# Patient Record
Sex: FEMALE | Race: WHITE | NOT HISPANIC OR LATINO | Employment: UNEMPLOYED | URBAN - METROPOLITAN AREA
[De-identification: names, ages, dates, MRNs, and addresses within clinical notes are randomized per-mention and may not be internally consistent; named-entity substitution may affect disease eponyms.]

---

## 2019-11-04 ENCOUNTER — OFFICE VISIT (OUTPATIENT)
Dept: ENDOCRINOLOGY | Facility: CLINIC | Age: 59
End: 2019-11-04

## 2019-11-04 VITALS
WEIGHT: 146 LBS | SYSTOLIC BLOOD PRESSURE: 100 MMHG | HEIGHT: 64 IN | OXYGEN SATURATION: 98 % | BODY MASS INDEX: 24.92 KG/M2 | DIASTOLIC BLOOD PRESSURE: 60 MMHG | HEART RATE: 67 BPM

## 2019-11-04 DIAGNOSIS — E03.8 SUBCLINICAL HYPOTHYROIDISM: Primary | ICD-10-CM

## 2019-11-04 PROCEDURE — 99204 OFFICE O/P NEW MOD 45 MIN: CPT | Performed by: INTERNAL MEDICINE

## 2019-11-04 RX ORDER — ESTRADIOL 0.1 MG/D
FILM, EXTENDED RELEASE TRANSDERMAL
COMMUNITY
Start: 2019-10-27

## 2019-11-04 RX ORDER — ESTRADIOL 0.05 MG/D
FILM, EXTENDED RELEASE TRANSDERMAL
COMMUNITY
Start: 2019-10-27

## 2019-11-04 NOTE — PROGRESS NOTES
"Chief Complaint   Patient presents with   • Thyroid Problem     New patient referred by Janice LEON       HPI:   Estephania Fitch is a 59 y.o.female referred by EDWARD Benitez for further evaluation of her thyroid gland. Her history is as follows:    1) abnormal thyroid function tests:  - pt reports developing hypopigmentation in the upper anterior neck with a subjective feeling of tissue swelling that began approximately 1-2 years ago  - she reports chronic fatigue and \"brain fog\", weight gain since 2016/2017. She has had multiple thyroid function tests and thyroid antibody tests. Results have not shown overt hypothyroidism.   - Pt had a TSH in the upper end of the normal range in 2017. She was prescribed levothyroxine which she took for a short time. She states she didn't tolerate the medication.  - She then started to take OTC supplements call \"thyroid support\" that basically contains zinc, selenium, kelp, ashgwanda and Tyrosine. She also started taking iodine supplements.     Outside Lab summary:  (05/10/2016) TSH 2.090  (06/01/2017) TSH 4.330 (0.450 - 4.500): pt prescribed levothyroxine  (07/27/2017) TSH 2.500 (0.450 - 4.500) - on levothyroxine, took for a short time. States she didn't tolerate  (06/11/2018) TSH 2.850 (0.450 - 4.500) - on no medication  (04/24/2019) TSH 1.700 (0.450 - 4.50), Free T4 1.2 (0.5 - 1.8), Free T3 2.6 (2.3 - 4.2), TPO Ab 1, TG Ab  <1  (08/24/2019) TSH 4.590 (0.450 - 4.50), Free T4 1.28 (0.82 - 1.77), TPO Ab 1, TG Ab  <1    FMHx: she has two sisters with hypothyroidism who take thyroid hormone extracts. No known thyroid cancer in the family  PMHx: had sleep study in [place] that was reported as normal    Review of Systems   Constitutional: Positive for fatigue.        Weight gain   HENT: Negative.    Eyes: Negative.    Respiratory: Negative.    Cardiovascular: Negative.    Gastrointestinal:        Frequent heartburn   Endocrine: Negative.    Genitourinary: " "Negative.    Musculoskeletal: Negative.    Skin: Negative.    Allergic/Immunologic: Negative.    Neurological: Negative.    Hematological: Negative.    Psychiatric/Behavioral: Negative for sleep disturbance.        Difficulties with memory     Past Medical History:   Diagnosis Date   • Hypothyroidism      family history includes Cancer in her father and mother; Diabetes in her mother; Heart disease in her mother; Thyroid disease in her maternal aunt and sister.  Past Surgical History:   Procedure Laterality Date   • COLONOSCOPY     • HYSTERECTOMY  05/1999   • OOPHORECTOMY      Age 13     Social History     Tobacco Use   • Smoking status: Never Smoker   • Smokeless tobacco: Never Used   Substance Use Topics   • Alcohol use: No     Frequency: Never   • Drug use: No     Outpatient Medications Prior to Visit   Medication Sig Dispense Refill   • estradiol (MINIVELLE, VIVELLE-DOT) 0.05 MG/24HR patch      • estradiol (MINIVELLE, VIVELLE-DOT) 0.1 MG/24HR patch      • testosterone (ANDROGEL) 25 MG/2.5GM (1%) gel gel Place  on the skin Daily.     • estradiol (VIVELLE-DOT) 0.025 MG/24HR patch Place 1 patch on the skin 2 (Two) Times a Week.     • hyoscyamine (LEVSIN) 0.125 MG SL tablet Take 1 tablet by mouth Every 4 (Four) Hours As Needed for cramping. 15 tablet 0     No facility-administered medications prior to visit.      Allergies   Allergen Reactions   • Sulfa Antibiotics Hives       /60   Pulse 67   Ht 162.6 cm (64\")   Wt 66.2 kg (146 lb)   SpO2 98%   BMI 25.06 kg/m²   Physical Exam   Constitutional: She is oriented to person, place, and time. She appears well-developed. No distress.   HENT:   Head: Normocephalic.   Mouth/Throat: Oropharynx is clear and moist.   Eyes: Conjunctivae and EOM are normal. Pupils are equal, round, and reactive to light.   Neck: No tracheal deviation present. No thyromegaly present.   No palpable thyroid nodules     Cardiovascular: Normal rate, regular rhythm and normal heart sounds. "   No murmur heard.  Pulmonary/Chest: Effort normal and breath sounds normal. No respiratory distress.   Abdominal: Soft. Bowel sounds are normal. She exhibits no mass. There is no tenderness.   Lymphadenopathy:     She has no cervical adenopathy.   Neurological: She is alert and oriented to person, place, and time. No cranial nerve deficit.   Skin: Skin is warm and dry. She is not diaphoretic. No erythema.   Anterior neck with hypopigmentation c/w vitiligo   Psychiatric: She has a normal mood and affect. Her behavior is normal.   Vitals reviewed.    LABS/IMAGING: outside records reviewed and summarized in HPI  Results for orders placed or performed in visit on 11/04/19   TSH   Result Value Ref Range    TSH 4.200 0.270 - 4.200 uIU/mL       ASSESSMENT/PLAN:  1) subclinical hypothyroidism:  - clinically euthyroid on exam  - TSH checked today WNL but at the upper end of the normal range.   - looked at her Thyroid gland briefly with US in clinic which showed a slightly hypoechoic, heterogeneous gland with increased vascularity and no discrete nodules. A few 1-2 mm cysts were seen scattered throughout the gland. Theses were non-specific findings.   - Discussed with patient that her symptoms may nothing to do with her thyroid gland. However, her recent labs is 08/2019 and 11/2019 could be treated as subclinical hypothyroidism. She has sisters who take thyroid hormone extract, so she is interested in pursuing that formulation of thyroid hormone  - Discussed with patient that a trial of NP Thyroid 15 mg daily would be okay;however, if she notes no difference on the medication, she should remain off thyroid hormone until she develops overt hypothyroidism.  - Pt given samples of NP thyroid 15 mg daily    RTC 4 months    Counseling was given to patient for the following topics:  diagnostic results, instructions for management, impressions and risks and benefits of treatment options, see details in assessment/plan. Total face to  face time of the encounter was 45 minutes and 30 minutes was spent counseling.

## 2019-11-05 LAB — TSH SERPL DL<=0.005 MIU/L-ACNC: 4.2 UIU/ML (ref 0.27–4.2)

## 2019-11-10 ENCOUNTER — TELEPHONE (OUTPATIENT)
Dept: ENDOCRINOLOGY | Facility: CLINIC | Age: 59
End: 2019-11-10

## 2023-07-07 PROBLEM — R07.9 CHEST PAIN: Status: ACTIVE | Noted: 2023-07-07

## 2023-07-07 PROBLEM — R00.2 PALPITATIONS: Status: ACTIVE | Noted: 2023-07-07

## 2025-05-22 ENCOUNTER — TELEPHONE (OUTPATIENT)
Dept: CARDIOLOGY | Facility: CLINIC | Age: 65
End: 2025-05-22
Payer: COMMERCIAL

## 2025-05-28 ENCOUNTER — TELEPHONE (OUTPATIENT)
Dept: CARDIOLOGY | Facility: CLINIC | Age: 65
End: 2025-05-28
Payer: COMMERCIAL

## 2025-06-02 ENCOUNTER — OFFICE VISIT (OUTPATIENT)
Dept: CARDIOLOGY | Facility: CLINIC | Age: 65
End: 2025-06-02
Payer: MEDICARE

## 2025-06-02 VITALS
OXYGEN SATURATION: 99 % | HEIGHT: 64 IN | WEIGHT: 141.4 LBS | SYSTOLIC BLOOD PRESSURE: 112 MMHG | BODY MASS INDEX: 24.14 KG/M2 | HEART RATE: 64 BPM | DIASTOLIC BLOOD PRESSURE: 72 MMHG

## 2025-06-02 DIAGNOSIS — R07.2 PRECORDIAL PAIN: ICD-10-CM

## 2025-06-02 DIAGNOSIS — R00.2 PALPITATIONS: Primary | ICD-10-CM

## 2025-06-02 PROCEDURE — 93000 ELECTROCARDIOGRAM COMPLETE: CPT | Performed by: INTERNAL MEDICINE

## 2025-06-02 PROCEDURE — 1159F MED LIST DOCD IN RCRD: CPT | Performed by: INTERNAL MEDICINE

## 2025-06-02 PROCEDURE — 99214 OFFICE O/P EST MOD 30 MIN: CPT | Performed by: INTERNAL MEDICINE

## 2025-06-02 PROCEDURE — 1160F RVW MEDS BY RX/DR IN RCRD: CPT | Performed by: INTERNAL MEDICINE

## 2025-06-02 NOTE — PROGRESS NOTES
OFFICE VISIT  NOTE  [facility] CARDIOLOGY      Name: Estephania Fitch    Date: 2025  MRN:  [id number]  :  1960      REFERRING/PRIMARY PROVIDER:  Martell Marie MD    Chief Complaint   Patient presents with    Chest pain, unspecified type       HPI: Estephania Fitch is a 65 y.o. female who presents for palpitations.   Echo at [facility] 2023 showed normal ejection fraction with trace to mild TR, PASP 39.  She has not had a sleep study in the past.  Holter monitor showed 17 short episodes of AT, otherwise no arrhythmia.  She had a low risk stress test 2023.  She reports more palpitations since 2025, she has been adjusting her hormone replacement therapy and thinks it may be related to that or back and neck pain which she suffered while lifting her grandchild and teen care of him over a couple months time.  Palpitations are daily, feels like flip-flops, flutters, skipped beats.    Past Medical History:   Diagnosis Date    Arrhythmia 2023    Started Liothyronnie 3/27/23    History of echocardiogram 2023    History of EKG 06/15/2023    History of stress test 2023    Subclinical hypothyroidism        Past Surgical History:   Procedure Laterality Date    COLONOSCOPY      HYSTERECTOMY  1999    OOPHORECTOMY      Age 13       Social History     Socioeconomic History    Marital status:    Tobacco Use    Smoking status: Never    Smokeless tobacco: Never   Vaping Use    Vaping status: Never Used   Substance and Sexual Activity    Alcohol use: No    Drug use: No    Sexual activity: Yes     Partners: Male     Birth control/protection: Hysterectomy       Family History   Problem Relation Age of Onset    Cancer Mother     Heart disease Mother     Diabetes Mother     Anemia Mother         Also had Lukemia    Cancer Father     Asthma Father     Heart attack Father     Thyroid disease Sister     Thyroid disease Maternal Aunt         ROS:   Constitutional no fever,  no weight  "loss   Skin no rash, no subcutaneous nodules   Otolaryngeal no difficulty swallowing   Cardiovascular See HPI   Pulmonary no cough, no sputum production   Gastrointestinal no constipation, no diarrhea   Genitourinary no dysuria, no hematuria   Hematologic no easy bruisability, no abnormal bleeding   Musculoskeletal no muscle pain   Neurologic no dizziness, no falls         Allergies   Allergen Reactions    Sulfa Antibiotics Hives         Current Outpatient Medications:     estradiol (MINIVELLE, VIVELLE-DOT) 0.1 MG/24HR patch, 1 patch 2 (Two) Times a Week., Disp: , Rfl:     FIRST-PROGESTERONE VGS 50 VA, Insert  into the vagina., Disp: , Rfl:     multivitamin (MULTI VITAMIN DAILY PO), Take  by mouth Daily., Disp: , Rfl:     Probiotic Product (PROBIOTIC BLEND PO), Take  by mouth Daily., Disp: , Rfl:     vitamin D3 125 MCG (5000 UT) capsule capsule, Take 1 capsule by mouth Daily. With k1 and K2, Disp: , Rfl:     Digestive Enzymes (DIGESTIVE ENZYME PO), Take  by mouth Daily., Disp: , Rfl:     ELDERBERRY PO, Take  by mouth Daily., Disp: , Rfl:     Pregnenolone Micronized (PREGNENOLONE PO), Take 25 mg by mouth Daily., Disp: , Rfl:     PROGESTERONE PO, Take 25 mg by mouth Daily., Disp: , Rfl:     testosterone (ANDROGEL) 25 MG/2.5GM (1%) gel gel, Place  on the skin as directed by provider Daily., Disp: , Rfl:     TURMERIC PO, Take  by mouth Daily.  4 gummies a day, Disp: , Rfl:     vitamin C (ASCORBIC ACID) 250 MG tablet, Take 4 tablets by mouth Daily., Disp: , Rfl:     Vitals:    06/02/25 0933   BP: 112/72   Pulse: 64   SpO2: 99%   Weight: 64.1 kg (141 lb 6.4 oz)   Height: 162.6 cm (64\")     Body mass index is 24.27 kg/m².    PHYSICAL EXAM:    General Appearance:   · well developed  · well nourished  Neck:  · thyroid not enlarged  · supple  Respiratory:  · no respiratory distress  · normal breath sounds  · no rales  Cardiovascular:  · no jugular venous distention  · regular rhythm  · apical impulse normal  · S1 normal, S2 " normal  · no S3, no S4   · no murmur  · no rub, no thrill  · carotid pulses normal; no bruit  · pedal pulses normal  · lower extremity edema: none    Skin:   warm, dry      RESULTS:     ECG 12 Lead    Date/Time: 6/2/2025 10:22 AM  Performed by: Rudy Cristina MD    Authorized by: Rudy Cristina MD  Comparison: compared with previous ECG from 5/15/2023  Similar to previous ECG  Rhythm: sinus rhythm  Ectopy: unifocal PVCs  Rate: normal  BPM: 67  QRS axis: normal    Clinical impression: non-specific ECG                Labs:  CBC 6/16/23   WBC 6.1   RBC 3.87   Hemoglobin 12.2   Hematocrit 37.3   Platelet 212       CMP 6/16/23   Glucose 80   BUN 16   Creatinine 0.66   Glomerular Filtration Rate 98   BUN/Creatinine Ratio NA   Sodium 138   Potassium 4.0   Chloride 104   Carbon Dioxide 29   Calcium 10.0   AST  22   ALT  14       Free T4 1.3   HGB A1C 5.0       Most recent PCP note, imaging tests, and labs reviewed.    ASSESSMENT:  Problem List Items Addressed This Visit       Palpitations - Primary    Overview   6/26/23 Echo @  regional: EF 60-65%, trace to mild TR, RVSP 33 mmHg  5/2023 7-Day Holter: 12 episodes AT, rare PACs and PVCs         Chest pain       PLAN:    1.  Palpitations:  7-day Holter ordered  PVC noted on EKG today in the office  Work on hydration, exercise, and good sleep habits    2.  Mild pulmonary hypertension:  Echo 6/2023 at [facility] showed normal ejection fraction with mild elevated PASP at 39 mmHg.  Recommend sleep study routine scheduling.  Repeat echocardiogram ordered 6/2025    3.  Mild hyperlipidemia  Total cholesterol 235, , HDL 88, continue with saturated fat diet and exercise      Return to clinic in 6 months, or sooner as needed.    Thank you for the opportunity to share in the care of your patient; please do not hesitate to call me with any questions.     Rudy Cristina MD, [facility], [place]  Office: [phone]  [facility]  [address]    06/02/25

## 2025-06-05 DIAGNOSIS — R00.2 PALPITATIONS: ICD-10-CM

## 2025-06-05 DIAGNOSIS — R07.2 PRECORDIAL PAIN: Primary | ICD-10-CM

## 2025-07-25 ENCOUNTER — HOSPITAL ENCOUNTER (OUTPATIENT)
Dept: CARDIOLOGY | Facility: HOSPITAL | Age: 65
Discharge: HOME | End: 2025-07-25
Payer: MEDICARE

## 2025-07-25 VITALS — WEIGHT: 141 LBS | BODY MASS INDEX: 24.07 KG/M2 | HEIGHT: 64 IN

## 2025-07-25 DIAGNOSIS — R00.2 PALPITATIONS: ICD-10-CM

## 2025-07-25 DIAGNOSIS — R07.2 PRECORDIAL PAIN: ICD-10-CM

## 2025-07-25 PROCEDURE — 93306 TTE W/DOPPLER COMPLETE: CPT

## 2025-07-28 LAB
AORTIC DIMENSIONLESS INDEX: 0.83 (DI)
AV MEAN PRESS GRAD SYS DOP V1V2: 3 MMHG
AV VMAX SYS DOP: 115 CM/SEC
BH CV ECHO MEAS - AO MAX PG: 5.3 MMHG
BH CV ECHO MEAS - AO ROOT DIAM: 2.9 CM
BH CV ECHO MEAS - AO V2 VTI: 29.1 CM
BH CV ECHO MEAS - AVA(I,D): 2.35 CM2
BH CV ECHO MEAS - EDV(CUBED): 59.3 ML
BH CV ECHO MEAS - EDV(MOD-SP2): 67.6 ML
BH CV ECHO MEAS - EDV(MOD-SP4): 72.3 ML
BH CV ECHO MEAS - EF(MOD-SP2): 67 %
BH CV ECHO MEAS - EF(MOD-SP4): 66.9 %
BH CV ECHO MEAS - ESV(CUBED): 22 ML
BH CV ECHO MEAS - ESV(MOD-SP2): 22.3 ML
BH CV ECHO MEAS - ESV(MOD-SP4): 23.9 ML
BH CV ECHO MEAS - FS: 28.2 %
BH CV ECHO MEAS - IVS/LVPW: 1 CM
BH CV ECHO MEAS - IVSD: 0.7 CM
BH CV ECHO MEAS - LA DIMENSION: 2.7 CM
BH CV ECHO MEAS - LAT PEAK E' VEL: 12.5 CM/SEC
BH CV ECHO MEAS - LV DIASTOLIC VOL/BSA (35-75): 42.9 CM2
BH CV ECHO MEAS - LV MASS(C)D: 75.1 GRAMS
BH CV ECHO MEAS - LV MAX PG: 5.8 MMHG
BH CV ECHO MEAS - LV MEAN PG: 3 MMHG
BH CV ECHO MEAS - LV SYSTOLIC VOL/BSA (12-30): 14.2 CM2
BH CV ECHO MEAS - LV V1 MAX: 120 CM/SEC
BH CV ECHO MEAS - LV V1 VTI: 24.1 CM
BH CV ECHO MEAS - LVIDD: 3.9 CM
BH CV ECHO MEAS - LVIDS: 2.8 CM
BH CV ECHO MEAS - LVOT AREA: 2.8 CM2
BH CV ECHO MEAS - LVOT DIAM: 1.9 CM
BH CV ECHO MEAS - LVPWD: 0.7 CM
BH CV ECHO MEAS - MED PEAK E' VEL: 9.4 CM/SEC
BH CV ECHO MEAS - MV A MAX VEL: 51.4 CM/SEC
BH CV ECHO MEAS - MV DEC SLOPE: 229 CM/SEC2
BH CV ECHO MEAS - MV DEC TIME: 0.32 SEC
BH CV ECHO MEAS - MV E MAX VEL: 72.8 CM/SEC
BH CV ECHO MEAS - MV E/A: 1.42
BH CV ECHO MEAS - MV MAX PG: 2.11 MMHG
BH CV ECHO MEAS - MV MEAN PG: 1 MMHG
BH CV ECHO MEAS - MV V2 VTI: 25.3 CM
BH CV ECHO MEAS - MVA(VTI): 2.7 CM2
BH CV ECHO MEAS - PA V2 MAX: 77.4 CM/SEC
BH CV ECHO MEAS - RAP SYSTOLE: 3 MMHG
BH CV ECHO MEAS - RV MAX PG: 1.51 MMHG
BH CV ECHO MEAS - RV V1 MAX: 61.5 CM/SEC
BH CV ECHO MEAS - RV V1 VTI: 12.8 CM
BH CV ECHO MEAS - RVSP: 33 MMHG
BH CV ECHO MEAS - SV(LVOT): 68.3 ML
BH CV ECHO MEAS - SV(MOD-SP2): 45.3 ML
BH CV ECHO MEAS - SV(MOD-SP4): 48.4 ML
BH CV ECHO MEAS - SVI(LVOT): 40.5 ML/M2
BH CV ECHO MEAS - SVI(MOD-SP2): 26.9 ML/M2
BH CV ECHO MEAS - SVI(MOD-SP4): 28.7 ML/M2
BH CV ECHO MEAS - TAPSE (>1.6): 2.2 CM
BH CV ECHO MEAS - TR MAX PG: 30.5 MMHG
BH CV ECHO MEAS - TR MAX VEL: 276 CM/SEC
BH CV ECHO MEASUREMENTS AVERAGE E/E' RATIO: 6.65
BH CV XLRA - RV BASE: 3 CM
BH CV XLRA - RV LENGTH: 6.4 CM
BH CV XLRA - RV MID: 2.2 CM
BH CV XLRA - TDI S': 14.3 CM/SEC
IVRT: 77 MS
LEFT ATRIUM VOLUME INDEX: 22.7 ML/M2
LV EF BIPLANE MOD: 66.5 %